# Patient Record
Sex: MALE | Race: BLACK OR AFRICAN AMERICAN | Employment: FULL TIME | URBAN - METROPOLITAN AREA
[De-identification: names, ages, dates, MRNs, and addresses within clinical notes are randomized per-mention and may not be internally consistent; named-entity substitution may affect disease eponyms.]

---

## 2018-02-12 ENCOUNTER — HOSPITAL ENCOUNTER (EMERGENCY)
Age: 28
Discharge: HOME | End: 2018-02-13
Attending: EMERGENCY MEDICINE | Admitting: EMERGENCY MEDICINE
Payer: COMMERCIAL

## 2018-02-12 VITALS
OXYGEN SATURATION: 96 % | TEMPERATURE: 100.3 F | SYSTOLIC BLOOD PRESSURE: 169 MMHG | HEART RATE: 100 BPM | BODY MASS INDEX: 37.19 KG/M2 | WEIGHT: 315 LBS | DIASTOLIC BLOOD PRESSURE: 102 MMHG | HEIGHT: 77 IN | RESPIRATION RATE: 16 BRPM

## 2018-02-12 DIAGNOSIS — J03.90 ACUTE TONSILLITIS, UNSPECIFIED ETIOLOGY: Primary | ICD-10-CM

## 2018-02-12 LAB — DEPRECATED S PYO AG THROAT QL EIA: NEGATIVE

## 2018-02-12 PROCEDURE — 99283 EMERGENCY DEPT VISIT LOW MDM: CPT

## 2018-02-12 PROCEDURE — 87880 STREP A ASSAY W/OPTIC: CPT | Performed by: EMERGENCY MEDICINE

## 2018-02-12 PROCEDURE — 74011250636 HC RX REV CODE- 250/636: Performed by: PHYSICIAN ASSISTANT

## 2018-02-12 PROCEDURE — 74011636637 HC RX REV CODE- 636/637: Performed by: PHYSICIAN ASSISTANT

## 2018-02-12 PROCEDURE — 74011250637 HC RX REV CODE- 250/637

## 2018-02-12 PROCEDURE — 96372 THER/PROPH/DIAG INJ SC/IM: CPT

## 2018-02-12 PROCEDURE — 87070 CULTURE OTHR SPECIMN AEROBIC: CPT | Performed by: EMERGENCY MEDICINE

## 2018-02-12 RX ORDER — PREDNISONE 20 MG/1
60 TABLET ORAL
Status: COMPLETED | OUTPATIENT
Start: 2018-02-12 | End: 2018-02-12

## 2018-02-12 RX ORDER — IBUPROFEN 800 MG/1
800 TABLET ORAL
Qty: 20 TAB | Refills: 0 | Status: SHIPPED | OUTPATIENT
Start: 2018-02-12 | End: 2018-02-19

## 2018-02-12 RX ORDER — ACETAMINOPHEN 325 MG/1
650 TABLET ORAL
Status: COMPLETED | OUTPATIENT
Start: 2018-02-12 | End: 2018-02-12

## 2018-02-12 RX ORDER — ACETAMINOPHEN 325 MG/1
TABLET ORAL
Status: COMPLETED
Start: 2018-02-12 | End: 2018-02-12

## 2018-02-12 RX ADMIN — PENICILLIN G BENZATHINE 1.2 MILLION UNITS: 1200000 INJECTION, SUSPENSION INTRAMUSCULAR at 23:54

## 2018-02-12 RX ADMIN — ACETAMINOPHEN 650 MG: 325 TABLET ORAL at 22:54

## 2018-02-12 RX ADMIN — PREDNISONE 60 MG: 20 TABLET ORAL at 23:54

## 2018-02-12 NOTE — LETTER
[address] 
[facility] EMERGENCY DEPT 
[address] [place] [address] [id number] 
[phone] Work/School Note Date: 2/12/2018 To Whom It May concern: 
 
Negrito Naik was seen and treated today in the emergency room by the following provider(s): 
Attending Provider: Aranza Hernandez MD 
Physician Assistant: HERRERA Oliva. Negrito Naik may return to work on [id number]. Sincerely, HERRERA Oliva

## 2018-02-13 NOTE — ED PROVIDER NOTES
EMERGENCY DEPARTMENT HISTORY AND PHYSICAL EXAM      Date: 2/12/2018  Patient Name: Clint Ingram    History of Presenting Illness     Chief Complaint   Patient presents with    Sore Throat     patient complain of sore throat since yesterday    Generalized Body Aches     patient also complain of generalized body aches        History Provided By: Patient    HPI: Clint Ingram, 33 y.o. male, presents ambulatory to the ED with cc of a worsening sore throat with associated fever and general body aches since yesterday. Pt states he has taken Ibuprofen which helped alleviate his fever. He denies cough. PCP: Nitish Whyte MD    There are no other complaints, changes, or physical findings at this time. Past History     Past Medical History:  History reviewed. No pertinent past medical history. Past Surgical History:  History reviewed. No pertinent surgical history. Family History:  History reviewed. No pertinent family history. Social History:  Social History   Substance Use Topics    Smoking status: None    Smokeless tobacco: None    Alcohol use None       Allergies:  No Known Allergies      Review of Systems   Review of Systems   Constitutional: Positive for fever. Negative for fatigue. HENT: Positive for sore throat. Negative for congestion, ear pain and rhinorrhea. Eyes: Negative for pain and redness. Respiratory: Negative for cough and wheezing. Cardiovascular: Negative for chest pain and palpitations. Gastrointestinal: Negative for abdominal pain, nausea and vomiting. Genitourinary: Negative for dysuria, frequency and urgency. Musculoskeletal: Positive for myalgias (general body aches). Negative for back pain, neck pain and neck stiffness. Skin: Negative for rash and wound. Neurological: Negative for weakness, light-headedness, numbness and headaches. Physical Exam   Physical Exam   Constitutional: He is oriented to person, place, and time.  He appears well-developed and well-nourished. Non-toxic appearance. No distress. HENT:   Head: Normocephalic and atraumatic. Head is without right periorbital erythema and without left periorbital erythema. Right Ear: External ear normal.   Left Ear: External ear normal.   Nose: Nose normal.   Mouth/Throat: Uvula is midline. No trismus in the jaw. Oropharyngeal exudate, posterior oropharyngeal edema and posterior oropharyngeal erythema present. Submandibular adenopathy   Eyes: Conjunctivae and EOM are normal. Pupils are equal, round, and reactive to light. No scleral icterus. Neck: Normal range of motion and full passive range of motion without pain. Cardiovascular: Normal rate, regular rhythm and normal heart sounds. Pulmonary/Chest: Effort normal and breath sounds normal. No accessory muscle usage. No tachypnea. No respiratory distress. He has no decreased breath sounds. He has no wheezes. Abdominal: Soft. There is no tenderness. There is no rigidity and no guarding. Musculoskeletal: Normal range of motion. Neurological: He is alert and oriented to person, place, and time. He is not disoriented. No cranial nerve deficit or sensory deficit. GCS eye subscore is 4. GCS verbal subscore is 5. GCS motor subscore is 6. Skin: Skin is intact. No rash noted. Psychiatric: He has a normal mood and affect. His speech is normal.   Nursing note and vitals reviewed. Diagnostic Study Results     Labs -     Recent Results (from the past 12 hour(s))   STREP AG SCREEN, GROUP A    Collection Time: 02/12/18 10:48 PM   Result Value Ref Range    Group A Strep Ag ID NEGATIVE  NEG         Medical Decision Making   I am the first provider for this patient. I reviewed the vital signs, available nursing notes, past medical history, past surgical history, family history and social history. Vital Signs-Reviewed the patient's vital signs.   Patient Vitals for the past 12 hrs:   Temp Pulse Resp BP SpO2   02/12/18 2145 100.3 °F (37.9 °C) 100 16 (!) 169/102 96 %       Pulse Oximetry Analysis - 96% on room air    Cardiac Monitor:   Rate: 100 bpm  Rhythm: Normal Sinus Rhythm 169/102     Records Reviewed: Nursing Notes and Old Medical Records    Provider Notes (Medical Decision Making): On clinical exam, the patient has no peritonsillar abscess, voice chances or uvula deviation to suggest peritonsillar abscess    There is no drooling, tripodding, voice changes, dysphagia/odynophagia, or difficulty breathing to suggest epiglottitis    There are no voices changes, buldge to back of throat, dysphagia/odynophagia, difficulty with flexing/extending neck to suggest retreophayngeal abscess    There is no induration to the sumental area to suggest Ludwigs angina. Furthermore, dentition is not poor    Centor Criteria POS    1) tonsillar hypertrophy  2) tender anterior adenopathy  3) hx of a fever  4) absence of a cough    Defer additional testing  Will treat empirically for GABHS    ED Course:   Initial assessment performed. The patients presenting problems have been discussed, and they are in agreement with the care plan formulated and outlined with them. I have encouraged them to ask questions as they arise throughout their visit. Disposition:  DISCHARGE NOTE  11:42 PM  The patient has been re-evaluated and is ready for discharge. Reviewed available results with patient. Counseled patient on diagnosis and care plan. Patient has expressed understanding, and all questions have been answered. Patient agrees with plan and agrees to follow up as recommended, or return to the ED if their symptoms worsen. Discharge instructions have been provided and explained to the patient, along with reasons to return to the ED. PLAN:  1. Discharge Medication List as of 2/12/2018 11:42 PM      START taking these medications    Details   ibuprofen (MOTRIN) 800 mg tablet Take 1 Tab by mouth every eight (8) hours as needed for Pain for up to 7 days. , Print, Disp-20 Tab, R-0           2. Follow-up Information     Follow up With Details Comments Contact Ezio Muse MD Schedule an appointment as soon as possible for a visit PRIMARY CARE: call to schedule follow up [address] [facility]  [phone]          Return to ED if worse     Diagnosis     Clinical Impression:   1. Acute tonsillitis, unspecified etiology        Attestations: This note is prepared by Sterling Flores, acting as Scribe for HEBERT Pickens. HEBERT Pickens: The scribe's documentation has been prepared under my direction and personally reviewed by me in its entirety. I confirm that the note above accurately reflects all work, treatment, procedures, and medical decision making performed by me.

## 2018-02-15 LAB
BACTERIA SPEC CULT: NORMAL
SERVICE CMNT-IMP: NORMAL